# Patient Record
Sex: FEMALE | Race: WHITE | URBAN - METROPOLITAN AREA
[De-identification: names, ages, dates, MRNs, and addresses within clinical notes are randomized per-mention and may not be internally consistent; named-entity substitution may affect disease eponyms.]

---

## 2018-11-09 NOTE — PATIENT DISCUSSION
pt called and wanted trial lense rx to be finalized, ok per [facility] for pt to order CL's 11/15/18.

## 2019-01-01 NOTE — PATIENT DISCUSSION
Cataract symptoms i.e., glare, blur discussed. Pt to call if worsening vision or trouble with driving, TV, reading, ADL. UV precautions. Reviewed possibility of future cataract surgery. [NL] : warm [de-identified] : hyperpigented macule on right mid-abdomen

## 2022-04-12 NOTE — PATIENT DISCUSSION
pt called and wanted trial lense rx to be finalized, ok per [facility] for pt to order CL's 11/15/18.

## 2022-05-10 ENCOUNTER — ESTABLISHED PATIENT (OUTPATIENT)
Dept: URBAN - METROPOLITAN AREA EYE CENTER 2 | Facility: EYE CENTER | Age: 82
End: 2022-05-10

## 2022-05-10 DIAGNOSIS — E11.9: ICD-10-CM

## 2022-05-10 DIAGNOSIS — H01.00A: ICD-10-CM

## 2022-05-10 DIAGNOSIS — H04.123: ICD-10-CM

## 2022-05-10 DIAGNOSIS — H01.00B: ICD-10-CM

## 2022-05-10 PROCEDURE — 92014 COMPRE OPH EXAM EST PT 1/>: CPT

## 2022-05-10 ASSESSMENT — VISUAL ACUITY
OD_SC: 20/40
OS_SC: 20/40-2
OU_SC: 20/40-2

## 2022-05-10 ASSESSMENT — TONOMETRY
OS_IOP_MMHG: 20
OD_IOP_MMHG: 21

## 2022-06-21 RX ORDER — GLIPIZIDE 5 MG/1
TABLET, FILM COATED, EXTENDED RELEASE ORAL
COMMUNITY

## 2022-06-21 RX ORDER — ALLOPURINOL 300 MG/1
TABLET ORAL
COMMUNITY

## 2022-06-21 RX ORDER — GABAPENTIN 300 MG/1
1 CAPSULE ORAL
COMMUNITY
Start: 2019-05-08

## 2022-06-21 RX ORDER — HYDROCHLOROTHIAZIDE 12.5 MG/1
TABLET ORAL
COMMUNITY

## 2022-06-21 RX ORDER — AMLODIPINE BESYLATE 5 MG/1
TABLET ORAL
COMMUNITY

## 2022-06-21 RX ORDER — MELOXICAM 7.5 MG/1
TABLET ORAL
COMMUNITY

## 2022-06-21 RX ORDER — METFORMIN HYDROCHLORIDE 750 MG/1
TABLET, EXTENDED RELEASE ORAL
COMMUNITY

## 2022-06-21 RX ORDER — METHYLPREDNISOLONE 4 MG/1
TABLET ORAL
COMMUNITY
Start: 2017-05-16

## 2022-06-21 RX ORDER — ERGOCALCIFEROL (VITAMIN D2) 1250 MCG
CAPSULE ORAL
COMMUNITY

## 2022-06-21 RX ORDER — IBUPROFEN 800 MG/1
TABLET ORAL
COMMUNITY

## 2022-10-17 NOTE — PATIENT DISCUSSION
Recommended artificial tears to use: 1 drop 4x a day in both eyes. Patient will dress lower body with minimal assistance, AE as needed within 2-3 sessions.

## 2022-10-20 NOTE — PATIENT DISCUSSION
Discussed condition and exacerbating conditions/situations (e.g., dry/arid environments, overhead fans, air conditioners, side effect of medications). Patient presented after waiting 30 minutes with no reaction to  injections. Discharged from clinic.  Haider Cook RN

## 2022-10-21 NOTE — PATIENT DISCUSSION
Cataract surgery has been performed in the first eye and activities of daily living are still impaired. The patient would like to proceed with cataract surgery in the second eye as scheduled. The patient elects IOL OD Goal Basic -2.00.

## 2023-05-15 ENCOUNTER — ESTABLISHED PATIENT (OUTPATIENT)
Dept: URBAN - METROPOLITAN AREA CLINIC 8 | Facility: CLINIC | Age: 83
End: 2023-05-15

## 2023-05-15 DIAGNOSIS — E11.9: ICD-10-CM

## 2023-05-15 DIAGNOSIS — H04.123: ICD-10-CM

## 2023-05-15 PROCEDURE — 92014 COMPRE OPH EXAM EST PT 1/>: CPT

## 2023-05-15 ASSESSMENT — VISUAL ACUITY
OS_CC: 20/30-2
OS_CC: J1
OD_CC: J1
OD_CC: 20/40

## 2025-02-05 NOTE — PATIENT DISCUSSION
Glasses Rx given. [Alert] : alert [Well Nourished] : well nourished [No Acute Distress] : no acute distress [No Respiratory Distress] : no respiratory distress [No Accessory Muscle Use] : no accessory muscle use [Clear to Auscultation] : lungs were clear to auscultation bilaterally [Normal PMI] : the apical impulse was normal [Normal S1, S2] : normal S1 and S2 [Normal Rate] : heart rate was normal [Oriented x3] : oriented to person, place, and time [Normal Insight/Judgement] : insight and judgment were intact
